# Patient Record
Sex: FEMALE | Race: BLACK OR AFRICAN AMERICAN
[De-identification: names, ages, dates, MRNs, and addresses within clinical notes are randomized per-mention and may not be internally consistent; named-entity substitution may affect disease eponyms.]

---

## 2021-06-26 ENCOUNTER — HOSPITAL ENCOUNTER (EMERGENCY)
Dept: HOSPITAL 53 - M ED | Age: 21
Discharge: HOME | End: 2021-06-26
Payer: COMMERCIAL

## 2021-06-26 VITALS — WEIGHT: 179.02 LBS | HEIGHT: 65 IN | BODY MASS INDEX: 29.83 KG/M2

## 2021-06-26 VITALS — DIASTOLIC BLOOD PRESSURE: 94 MMHG | SYSTOLIC BLOOD PRESSURE: 140 MMHG

## 2021-06-26 DIAGNOSIS — S06.0X0A: ICD-10-CM

## 2021-06-26 DIAGNOSIS — S00.83XA: ICD-10-CM

## 2021-06-26 DIAGNOSIS — S00.81XA: ICD-10-CM

## 2021-06-26 DIAGNOSIS — S02.2XXA: Primary | ICD-10-CM
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
## 2021-06-26 NOTE — REP
INDICATION:

head injury



COMPARISON:

None.



TECHNIQUE:

Axial noncontrast images through the facial bones to include the mandible with coronal

and sagittal re-formations.



FINDINGS:

Comminuted multipartite displaced nasal bone fractures with overlying soft tissue

swelling.  Fracture of the nasal septum cannot be excluded.



Remainder of the osseous structures are intact and normal.  Sinuses and mastoid air

cells are relatively clear.  Bilateral orbits including globes and intraconal contents

are symmetric and normal.



IMPRESSION:

Comminuted multipartite displaced nasal bone fractures.  Nasal septal fracture cannot

be excluded.





<Electronically signed by Ravi Calabrese > 06/26/21 [id number]

## 2021-06-26 NOTE — REP
INDICATION:

head injury



COMPARISON:

None.



TECHNIQUE:

Axial noncontrast images from the skull base to the vertex with coronal reformations.



This CT examination was performed using the following dose reduction techniques:

Automated exposure control, adjustment of mA and/or kv according to the patient's

size, and use of iterative reconstruction technique.



FINDINGS:

The ventricles, sulci, and cisterns are normal in position and appearance. Gray-white

differentiation is maintained.  No acute intracranial hemorrhage, mass/mass effect,

pathology or trauma/injury.  No evidence for acute infarction.  No extra-axial fluid

collection.  Calvarium is intact.  Paranasal sinuses and mastoid air cells are clear.



IMPRESSION:

Normal noncontrast head CT.

No evidence for acute intracranial pathology or trauma/injury.





<Electronically signed by Ravi Calaberse > 06/26/21 [id number]

## 2021-08-09 ENCOUNTER — HOSPITAL ENCOUNTER (EMERGENCY)
Dept: HOSPITAL 53 - M ED | Age: 21
LOS: 1 days | Discharge: HOME | End: 2021-08-10
Payer: COMMERCIAL

## 2021-08-09 VITALS — SYSTOLIC BLOOD PRESSURE: 151 MMHG | DIASTOLIC BLOOD PRESSURE: 89 MMHG

## 2021-08-09 VITALS — BODY MASS INDEX: 28.47 KG/M2 | WEIGHT: 170.86 LBS | HEIGHT: 65 IN

## 2021-08-09 DIAGNOSIS — Z20.2: ICD-10-CM

## 2021-08-09 DIAGNOSIS — A60.09: Primary | ICD-10-CM

## 2021-08-09 PROCEDURE — 86780 TREPONEMA PALLIDUM: CPT

## 2021-08-09 PROCEDURE — 99282 EMERGENCY DEPT VISIT SF MDM: CPT

## 2021-08-09 PROCEDURE — 87389 HIV-1 AG W/HIV-1&-2 AB AG IA: CPT

## 2021-08-09 PROCEDURE — 86706 HEP B SURFACE ANTIBODY: CPT

## 2021-08-09 PROCEDURE — 96372 THER/PROPH/DIAG INJ SC/IM: CPT

## 2021-08-09 PROCEDURE — 36415 COLL VENOUS BLD VENIPUNCTURE: CPT

## 2021-08-09 PROCEDURE — 87340 HEPATITIS B SURFACE AG IA: CPT

## 2021-08-09 PROCEDURE — 87210 SMEAR WET MOUNT SALINE/INK: CPT

## 2021-08-09 PROCEDURE — 86803 HEPATITIS C AB TEST: CPT

## 2021-08-09 PROCEDURE — 87661 TRICHOMONAS VAGINALIS AMPLIF: CPT

## 2021-08-10 LAB
HBV SURFACE AB SER QL: POSITIVE
HBV SURFACE AB SER-ACNC: NEGATIVE M[IU]/ML
HCV AB SER QL: 0.1 INDEX (ref ?–0.8)
HIV 1+2 AB+HIV1 P24 AG SERPL QL IA: NEGATIVE
N GONORRHOEA RRNA SPEC QL NAA+PROBE: NEGATIVE